# Patient Record
Sex: FEMALE | Race: WHITE | NOT HISPANIC OR LATINO | URBAN - METROPOLITAN AREA
[De-identification: names, ages, dates, MRNs, and addresses within clinical notes are randomized per-mention and may not be internally consistent; named-entity substitution may affect disease eponyms.]

---

## 2020-09-10 PROBLEM — Z00.00 ENCOUNTER FOR PREVENTIVE HEALTH EXAMINATION: Status: ACTIVE | Noted: 2020-09-10

## 2021-08-20 ENCOUNTER — EMERGENCY (EMERGENCY)
Facility: HOSPITAL | Age: 46
LOS: 1 days | Discharge: HOME | End: 2021-08-20
Attending: EMERGENCY MEDICINE | Admitting: EMERGENCY MEDICINE
Payer: COMMERCIAL

## 2021-08-20 VITALS
TEMPERATURE: 98 F | SYSTOLIC BLOOD PRESSURE: 116 MMHG | RESPIRATION RATE: 16 BRPM | HEART RATE: 64 BPM | OXYGEN SATURATION: 98 % | DIASTOLIC BLOOD PRESSURE: 74 MMHG

## 2021-08-20 VITALS
RESPIRATION RATE: 16 BRPM | WEIGHT: 175.05 LBS | DIASTOLIC BLOOD PRESSURE: 77 MMHG | OXYGEN SATURATION: 98 % | TEMPERATURE: 98 F | HEART RATE: 68 BPM | HEIGHT: 62 IN | SYSTOLIC BLOOD PRESSURE: 116 MMHG

## 2021-08-20 LAB — HCG UR QL: NEGATIVE — SIGNIFICANT CHANGE UP

## 2021-08-20 PROCEDURE — 70450 CT HEAD/BRAIN W/O DYE: CPT | Mod: 26,MA

## 2021-08-20 PROCEDURE — 99284 EMERGENCY DEPT VISIT MOD MDM: CPT

## 2021-08-20 PROCEDURE — 99284 EMERGENCY DEPT VISIT MOD MDM: CPT | Mod: 25

## 2021-08-20 PROCEDURE — 70450 CT HEAD/BRAIN W/O DYE: CPT

## 2021-08-20 PROCEDURE — 81025 URINE PREGNANCY TEST: CPT

## 2021-08-20 PROCEDURE — 96372 THER/PROPH/DIAG INJ SC/IM: CPT

## 2021-08-20 RX ORDER — DIPHENHYDRAMINE HCL 50 MG
1 CAPSULE ORAL
Qty: 15 | Refills: 0
Start: 2021-08-20

## 2021-08-20 RX ORDER — IBUPROFEN 200 MG
1 TABLET ORAL
Qty: 30 | Refills: 0
Start: 2021-08-20

## 2021-08-20 RX ORDER — KETOROLAC TROMETHAMINE 30 MG/ML
60 SYRINGE (ML) INJECTION ONCE
Refills: 0 | Status: DISCONTINUED | OUTPATIENT
Start: 2021-08-20 | End: 2021-08-20

## 2021-08-20 RX ADMIN — Medication 60 MILLIGRAM(S): at 09:05

## 2021-08-20 RX ADMIN — Medication 60 MILLIGRAM(S): at 08:45

## 2021-08-20 NOTE — ED PROVIDER NOTE - NSFOLLOWUPINSTRUCTIONS_ED_ALL_ED_FT
Migraine Headache    WHAT YOU NEED TO KNOW:    A migraine is a severe headache. The pain can be so severe that it interferes with your daily activities. A migraine can last a few hours up to several days. The exact cause of migraines is not known.    DISCHARGE INSTRUCTIONS:    Call your local emergency number (911 in the [place]) or have someone call if:   •You feel like you are going to faint, you become confused, or you have a seizure.          Return to the emergency department if:   •You have a headache that seems different or much worse than your usual migraine headache.      •You have a severe headache with a fever or a stiff neck.      •You have new problems with speech, vision, balance, or movement.      Call your doctor or neurologist if:   •Your migraines interfere with your daily activities.      •Your medicines or treatments stop working.      •You have questions or concerns about your condition or care.      Medicines: Some medicines may only be given while you are in the emergency department. You may also need medicines later to manage migraines or other health problems they can cause. Take medicine as soon as you feel a migraine begin, or as directed.  •Migraine medicines are used to help prevent or stop a migraine.      •NSAIDs help decrease swelling and pain or fever. This medicine is available with or without a doctor's order. NSAIDs can cause stomach bleeding or kidney problems in certain people. If you take blood thinner medicine, always ask your healthcare provider if NSAIDs are safe for you. Always read the medicine label and follow directions.      •Acetaminophen decreases pain and fever. It is available without a doctor's order. Ask how much to take and how often to take it. Follow directions. Read the labels of all other medicines you are using to see if they also contain acetaminophen, or ask your doctor or pharmacist. Acetaminophen can cause liver damage if not taken correctly. Do not use more than 4 grams (4,000 milligrams) total of acetaminophen in one day.       •Prescription pain medicine may be given. Ask your healthcare provider how to take this medicine safely. Some prescription pain medicines contain acetaminophen. Do not take other medicines that contain acetaminophen without talking to your healthcare provider. Too much acetaminophen may cause liver damage. Prescription pain medicine may cause constipation. Ask your healthcare provider how to prevent or treat constipation.       •Antinausea medicine may be given to calm your stomach and to help prevent vomiting. This medicine can also help relieve pain.      •Steroids may be given to prevent a migraine from coming back right away.      •Take your medicine as directed. Contact your healthcare provider if you think your medicine is not helping or if you have side effects. Tell him or her if you are allergic to any medicine. Keep a list of the medicines, vitamins, and herbs you take. Include the amounts, and when and why you take them. Bring the list or the pill bottles to follow-up visits. Carry your medicine list with you in case of an emergency.      Manage your symptoms:   •Rest in a dark, quiet room. This will help decrease your pain. Sleep may also help relieve the pain.      •Apply ice to decrease pain. Use an ice pack, or put crushed ice in a plastic bag. Cover the ice pack with a towel and place it on your head. Apply ice for 15 to 20 minutes every hour.      •Apply heat to decrease pain and muscle spasms. Use a small towel dampened with warm water or a heating pad, or sit in a warm bath. Apply heat on the area for 20 to 30 minutes every 2 hours. You may alternate heat and ice.      •Keep a migraine record. Write down when your migraines start and stop. Include your symptoms and what you were doing when a migraine began. Record what you ate or drank for 24 hours before the migraine started. Keep track of what you did to treat your migraine and if it worked. Bring the migraine record with you to visits with your healthcare provider.      Common triggers for a migraine include the following:   •Stress, eye strain, oversleeping, or not getting enough sleep      •Hormone changes in women from birth control pills, pregnancy, menopause, or during a monthly period      •Skipping meals, going too long without eating, or not drinking enough liquids      •Certain foods or drinks such as chocolate, hard cheese, alcohol, or drinks that contain caffeine      •Foods that contain gluten, nitrates, MSG, or artificial sweeteners      •Sunlight, bright or flashing lights, loud noises, smoke, or strong smells      •Heat, humidity, or changes in the weather      Prevent another migraine:   •Prevent a medicine overuse headache. Take pain medicines only as long as directed. A medicine may be limited to a certain amount each month. Your healthcare provider can help you create a plan so you get a safe amount each month.      •Do not smoke. Nicotine and other chemicals in cigarettes and cigars can trigger a migraine or make it worse. Ask your healthcare provider for information if you currently smoke and need help to quit. E-cigarettes or smokeless tobacco still contain nicotine. Talk to your healthcare provider before you use these products.      •Do not drink alcohol. Alcohol can trigger a migraine. It can also keep medicines used to treat your migraines from working.      •Be physically active. Physical activity, such as exercise, may help prevent migraines. Talk to your healthcare provider about the best activity plan for you. Try to get at least 30 minutes of physical activity on most days.   Family Walking for Exercise           •Manage stress. Stress may trigger a migraine. Learn new ways to relax, such as deep breathing.      •Create a sleep schedule. Go to bed and get up at the same times each day. Do not watch television before bed.      •Eat a variety of healthy foods. Include healthy foods such as fruit, vegetables, whole-grain breads, low-fat dairy products, beans, lean meat, and fish. Do not have food or drinks that trigger your migraines.  Healthy Foods           •Drink more liquids to prevent dehydration. Your healthcare provider can tell you how much liquid to drink each day and which liquids are best for you.      Follow up with your doctor or neurologist as directed: Bring your migraine record with you. Write down your questions so you remember to ask them during your visits. Migraine Headache    WHAT YOU NEED TO KNOW:    A migraine is a severe headache. The pain can be so severe that it interferes with your daily activities. A migraine can last a few hours up to several days. The exact cause of migraines is not known.    DISCHARGE INSTRUCTIONS:    Call your local emergency number (911 in the [place]) or have someone call if:   •You feel like you are going to faint, you become confused, or you have a seizure.          Return to the emergency department if:   •You have a headache that seems different or much worse than your usual migraine headache.      •You have a severe headache with a fever or a stiff neck.      •You have new problems with speech, vision, balance, or movement.      Call your doctor or neurologist if:   •Your migraines interfere with your daily activities.      •Your medicines or treatments stop working.      •You have questions or concerns about your condition or care.      Medicines: Some medicines may only be given while you are in the emergency department. You may also need medicines later to manage migraines or other health problems they can cause. Take medicine as soon as you feel a migraine begin, or as directed.  •Migraine medicines are used to help prevent or stop a migraine.      •NSAIDs help decrease swelling and pain or fever. This medicine is available with or without a doctor's order. NSAIDs can cause stomach bleeding or kidney problems in certain people. If you take blood thinner medicine, always ask your healthcare provider if NSAIDs are safe for you. Always read the medicine label and follow directions.      •Acetaminophen decreases pain and fever. It is available without a doctor's order. Ask how much to take and how often to take it. Follow directions. Read the labels of all other medicines you are using to see if they also contain acetaminophen, or ask your doctor or pharmacist. Acetaminophen can cause liver damage if not taken correctly. Do not use more than 4 grams (4,000 milligrams) total of acetaminophen in one day.       •Prescription pain medicine may be given. Ask your healthcare provider how to take this medicine safely. Some prescription pain medicines contain acetaminophen. Do not take other medicines that contain acetaminophen without talking to your healthcare provider. Too much acetaminophen may cause liver damage. Prescription pain medicine may cause constipation. Ask your healthcare provider how to prevent or treat constipation.       •Antinausea medicine may be given to calm your stomach and to help prevent vomiting. This medicine can also help relieve pain.      •Steroids may be given to prevent a migraine from coming back right away.      •Take your medicine as directed. Contact your healthcare provider if you think your medicine is not helping or if you have side effects. Tell him or her if you are allergic to any medicine. Keep a list of the medicines, vitamins, and herbs you take. Include the amounts, and when and why you take them. Bring the list or the pill bottles to follow-up visits. Carry your medicine list with you in case of an emergency.      Manage your symptoms:   •Rest in a dark, quiet room. This will help decrease your pain. Sleep may also help relieve the pain.      •Apply ice to decrease pain. Use an ice pack, or put crushed ice in a plastic bag. Cover the ice pack with a towel and place it on your head. Apply ice for 15 to 20 minutes every hour.      •Apply heat to decrease pain and muscle spasms. Use a small towel dampened with warm water or a heating pad, or sit in a warm bath. Apply heat on the area for 20 to 30 minutes every 2 hours. You may alternate heat and ice.      •Keep a migraine record. Write down when your migraines start and stop. Include your symptoms and what you were doing when a migraine began. Record what you ate or drank for 24 hours before the migraine started. Keep track of what you did to treat your migraine and if it worked. Bring the migraine record with you to visits with your healthcare provider.      Common triggers for a migraine include the following:   •Stress, eye strain, oversleeping, or not getting enough sleep      •Hormone changes in women from birth control pills, pregnancy, menopause, or during a monthly period      •Skipping meals, going too long without eating, or not drinking enough liquids      •Certain foods or drinks such as chocolate, hard cheese, alcohol, or drinks that contain caffeine      •Foods that contain gluten, nitrates, MSG, or artificial sweeteners      •Sunlight, bright or flashing lights, loud noises, smoke, or strong smells      •Heat, humidity, or changes in the weather      Prevent another migraine:   •Prevent a medicine overuse headache. Take pain medicines only as long as directed. A medicine may be limited to a certain amount each month. Your healthcare provider can help you create a plan so you get a safe amount each month.      •Do not smoke. Nicotine and other chemicals in cigarettes and cigars can trigger a migraine or make it worse. Ask your healthcare provider for information if you currently smoke and need help to quit. E-cigarettes or smokeless tobacco still contain nicotine. Talk to your healthcare provider before you use these products.      •Do not drink alcohol. Alcohol can trigger a migraine. It can also keep medicines used to treat your migraines from working.      •Be physically active. Physical activity, such as exercise, may help prevent migraines. Talk to your healthcare provider about the best activity plan for you. Try to get at least 30 minutes of physical activity on most days.   Family Walking for Exercise           •Manage stress. Stress may trigger a migraine. Learn new ways to relax, such as deep breathing.      •Create a sleep schedule. Go to bed and get up at the same times each day. Do not watch television before bed.      •Eat a variety of healthy foods. Include healthy foods such as fruit, vegetables, whole-grain breads, low-fat dairy products, beans, lean meat, and fish. Do not have food or drinks that trigger your migraines.  Healthy Foods           •Drink more liquids to prevent dehydration. Your healthcare provider can tell you how much liquid to drink each day and which liquids are best for you.      Follow up with your doctor or neurologist as directed: Bring your migraine record with you. Write down your questions so you remember to ask them during your visits.    Take Melatonin or Benadryl as discussed for sleep. Take Ibuprofen as directed for pain.

## 2021-08-20 NOTE — ED PROVIDER NOTE - CHPI ED SYMPTOMS NEG
stated
no blurred vision/no confusion/no fever/no loss of consciousness/no nausea/no numbness/no vomiting/no weakness/no change in level of consciousness

## 2021-08-20 NOTE — ED PROVIDER NOTE - EYES NEGATIVE STATEMENT, MLM
[facility]  Joint/Spine Preoperative Instructions    Surgery Date 8/3/20          Time of Arrival 0584  Contact # [phone] cell    1. On the day of your surgery, please report to the Surgical Services Registration Desk and sign in at your designated time. The Surgery Center is located to the right of the Emergency Room. 2. You must have someone with you to drive you home. You should not drive a car for 24 hours following surgery. Please make arrangements for a friend or family member to stay with you for the first 24 hours after your surgery. 3. No food after midnight 8/2/20*. Medications morning of surgery should be taken with a sip of water. Please follow pre-surgery drink instructions that were given at your Pre Admission Testing appointment. 4. We recommend you do not drink any alcoholic beverages for 24 hours before and after your surgery. 5. Contact your surgeons office for instructions on the following medications: non-steroidal anti-inflammatory drugs (i.e. Advil, Aleve), vitamins, and supplements. (Some surgeons will want you to stop these medications prior to surgery and others may allow you to take them)  **If you are currently taking Plavix, Coumadin, Aspirin and/or other blood-thinning agents, contact your surgeon for instructions. ** Your surgeon will partner with the physician prescribing these medications to determine if it is safe to stop or if you need to continue taking. Please do not stop taking these medications without instructions from your surgeon    6. Wear comfortable clothes. Wear glasses instead of contacts. Do not bring any money or jewelry. Please bring picture ID, insurance card, and any prearranged co-payment or hospital payment. Do not wear make-up, particularly mascara the morning of your surgery. Do not wear nail polish, particularly if you are having foot /hand surgery.   Wear your hair loose or down, no ponytails, buns, cassius pins or clips. All body piercings must be removed. Please shower with antibacterial soap for three consecutive days before and on the morning of surgery, but do not apply any lotions, powders or deodorants after the shower on the day of surgery. Please use a fresh towels after each shower. Please sleep in clean clothes and change bed linens the night before surgery. Please do not shave for 48 hours prior to surgery. Shaving of the face is acceptable. 7. You should understand that if you do not follow these instructions your surgery may be cancelled. If your physical condition changes (I.e. fever, cold or flu) please contact your surgeon as soon as possible. 8. It is important that you be on time. If a situation occurs where you may be late, please call [phone] (OR Holding Area). 9. If you have any questions and or problems, please call [phone] (Pre-admission Testing). 10. Your surgery time may be subject to change. You will receive a phone call the evening prior if your time changes. 11.  If having outpatient surgery, you must have someone to drive you here, stay with you during the duration of your stay, and to drive you home at time of discharge. 12. The following link is for the educational video for patients and/or families. [email or web address]-liang.org/. [email or web address]    Special Instructions:   COVID testing scheduled for 7/30/20 @ 1145.  (please see directions/location attached). Practice incentive spirometer per instructions and bring to hospital day of surgery. TAKE ALL MEDICATIONS THE DAY OF SURGERY EXCEPT: Lisinopril      I understand a pre-operative phone call will be made to verify my surgery time. In the event that I am not available, I give permission for a message to be left on my answering service and/or with another person?   yes         ___________________        __________ 7/21/20 @ [address] [place] [address]    (Signature of Patient)             (Witness)                (Date and Time) no discharge, no irritation, no pain, no redness, and no visual changes.

## 2021-08-20 NOTE — ED PROVIDER NOTE - PROGRESS NOTE DETAILS
CT neg. Feels much better after toradol. Plan - DC home with pain meds. FU Dr Escamilla as discussed.

## 2021-08-20 NOTE — ED PROVIDER NOTE - PROVIDER TOKENS
PROVIDER:[TOKEN:[[id number]],FOLLOWUP:[Urgent],ESTABLISHEDPATIENT:[T]],PROVIDER:[TOKEN:[[id number]],FOLLOWUP:[1-3 Days]]

## 2021-08-20 NOTE — ED ADULT NURSE NOTE - OBJECTIVE STATEMENT
Amb to ED. Pt c/o headache since 3am with slight nausea. Denies vomiting or visual disturbances. O/E color good. Skin warm 7 dry to touch. Lungs clear.

## 2021-08-20 NOTE — ED PROVIDER NOTE - PATIENT PORTAL LINK FT
You can access the FollowMyHealth Patient Portal offered by [facility] by registering at the following website: [email or web address]. By joining [employer]’s FollowMyHealth portal, you will also be able to view your health information using other applications (apps) compatible with our system.

## 2021-08-20 NOTE — ED PROVIDER NOTE - OBJECTIVE STATEMENT
44 yo F with hx of PCOS, co dizziness and headache with insomnia since 2 days ago. Took Ibuprofen 2 days ago with some improvement.  States she is under a lot of stress due to divorce she just filed for. Denies fever, visual disturbance, NV, CP or other symptom. Denies thunderclap.  PCP Andi

## 2021-08-20 NOTE — ED PROVIDER NOTE - CARE PROVIDER_API CALL
MAHESH ALMONTE  [facility]  [address] [place] [address]  Phone: [phone]  Fax: [phone]  Established Patient  Follow Up Time: Urgent    Beronica Thorpe  NEUROLOGY  [address] [place] [address]  Phone: [phone]  Fax: [phone]  Follow Up Time: 1-3 Days

## 2021-08-20 NOTE — ED ADULT NURSE NOTE - NSIMPLEMENTINTERV_GEN_ALL_ED
Implemented All Universal Safety Interventions:  [place] to call system. Call bell, personal items and telephone within reach. Instruct patient to call for assistance. Room bathroom lighting operational. Non-slip footwear when patient is off stretcher. Physically safe environment: no spills, clutter or unnecessary equipment. Stretcher in lowest position, wheels locked, appropriate side rails in place. Bilobed Transposition Flap Text: The defect edges were debeveled with a #15 scalpel blade.  Given the location of the defect and the proximity to free margins a bilobed transposition flap was deemed most appropriate.  Using a sterile surgical marker, an appropriate bilobe flap drawn around the defect.    The area thus outlined was incised deep to adipose tissue with a #15 scalpel blade.  The skin margins were undermined to an appropriate distance in all directions utilizing iris scissors.

## 2021-08-20 NOTE — ED PROVIDER NOTE - CLINICAL SUMMARY MEDICAL DECISION MAKING FREE TEXT BOX
Headache dizziness and anxiety for 2 days. normal. Likely stress related migraine. Plan - CT brain, Toradol. Neuro follow up.

## 2021-12-18 ENCOUNTER — TRANSCRIPTION ENCOUNTER (OUTPATIENT)
Age: 46
End: 2021-12-18

## 2022-02-02 ENCOUNTER — EMERGENCY (EMERGENCY)
Facility: HOSPITAL | Age: 47
LOS: 1 days | Discharge: HOME | End: 2022-02-02
Attending: EMERGENCY MEDICINE | Admitting: EMERGENCY MEDICINE
Payer: MEDICAID

## 2022-02-02 VITALS
HEART RATE: 91 BPM | DIASTOLIC BLOOD PRESSURE: 86 MMHG | SYSTOLIC BLOOD PRESSURE: 132 MMHG | OXYGEN SATURATION: 99 % | TEMPERATURE: 98 F | RESPIRATION RATE: 18 BRPM | HEIGHT: 62 IN | WEIGHT: 164.91 LBS

## 2022-02-02 PROCEDURE — 99283 EMERGENCY DEPT VISIT LOW MDM: CPT

## 2022-02-02 PROCEDURE — 99285 EMERGENCY DEPT VISIT HI MDM: CPT

## 2022-02-02 NOTE — ED ADULT NURSE REASSESSMENT NOTE - NS ED NURSE REASSESS COMMENT FT1
After speaking with patient Dr. Rojas feels psych eval and constant observation are not indicated. Patient may be discharged and does not need workup. Belongings returned, constant observation discontinued.

## 2022-02-02 NOTE — ED ADULT NURSE NOTE - HPI (INCLUDE ILLNESS QUALITY, SEVERITY, DURATION, TIMING, CONTEXT, MODIFYING FACTORS, ASSOCIATED SIGNS AND SYMPTOMS)
EMS was called to the house where she is living to check on her and be sure she is safe and not threatened. Patient expressed that she wished this would all end but denies meaning she wanted to hurt herself. Patient is in the middle of a divorce and also is having a workup for an abnormal mammo. EMS was called to the house where she is living to check on her and be sure she is safe and not threatened. Patient expressed that she wished this would all end but denies meaning she wanted to hurt herself. Patient is in the middle of a divorce and also is having a workup for an abnormal mammo. patient is currently lving with her in-laws and has 2 of her 4 children living there with her as well and states it is stressful because of the divorce but she doesn't feel in danger and is not being treated poorly there.

## 2022-02-02 NOTE — ED ADULT NURSE NOTE - OBJECTIVE STATEMENT
Brought in by mobile crisis for psych eval. Family member called for the patient to have a safety check because he was concerned about her treatment where she is living. Patient is in the process of a divorce and is living with her son's family. Patient also has had a questionable mammo recently and is scheduled for  diagnostic sono and mammo tomorrow which is causing concern. 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 Of note the patient had covid 12/2021. Brought in by mobile crisis for psych eval. Patient's good friend who is in [place] spoke with her today and was concerned and called for the patient to have a safety check because he was concerned about her treatment where she is living. Patient is in the process of a divorce and is living with her in-laws with 2 of her 4 children. Patient also has had a questionable mammo recently and is scheduled for  diagnostic sono and mammo tomorrow which is causing concern. 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 Of note the patient had covid 12/2021.

## 2022-02-02 NOTE — ED PROVIDER NOTE - PATIENT PORTAL LINK FT
You can access the FollowMyHealth Patient Portal offered by [facility] by registering at the following website: [email or web address]. By joining [employer]’s FollowMyHealth portal, you will also be able to view your health information using other applications (apps) compatible with our system.

## 2022-02-02 NOTE — ED PROVIDER NOTE - NSFOLLOWUPINSTRUCTIONS_ED_ALL_ED_FT
Stress, Adult      Stress is a normal reaction to life events. Stress is what you feel when life demands more than you are used to, or more than you think you can handle. Some stress can be useful, such as studying for a test or meeting a deadline at work. Stress that occurs too often or for too long can cause problems. It can affect your emotional health and interfere with relationships and normal daily activities. Too much stress can weaken your body's defense system (immune system) and increase your risk for physical illness. If you already have a medical problem, stress can make it worse.      What are the causes?    All sorts of life events can cause stress. An event that causes stress for one person may not be stressful for another person. Major life events, whether positive or negative, commonly cause stress. Examples include:  •Losing a job or starting a new job.      •Losing a loved one.      •Moving to a new town or home.      •Getting  or .      •Having a baby.      •Getting injured or sick.      Less obvious life events can also cause stress, especially if they occur day after day or in combination with each other. Examples include:  •Working long hours.      •Driving in traffic.      •Caring for children.      •Being in debt.      •Being in a difficult relationship.        What are the signs or symptoms?    Stress can cause emotional symptoms, including:  •Anxiety. This is feeling worried, afraid, on edge, overwhelmed, or out of control.      •Anger, including irritation or impatience.      •Depression. This is feeling sad, down, helpless, or guilty.      •Trouble focusing, remembering, or making decisions.      Stress can cause physical symptoms, including:  •Aches and pains. These may affect your head, neck, back, stomach, or other areas of your body.      •Tight muscles or a clenched jaw.      •Low energy.      •Trouble sleeping.      Stress can cause unhealthy behaviors, including:  •Eating to feel better (overeating) or skipping meals.      •Working too much or putting off tasks.      •Smoking, drinking alcohol, or using drugs to feel better.        How is this diagnosed?    Stress is diagnosed through an assessment by your health care provider. He or she may diagnose this condition based on:  •Your symptoms and any stressful life events.      •Your medical history.      •Tests to rule out other causes of your symptoms.      Depending on your condition, your health care provider may refer you to a specialist for further evaluation.      How is this treated?     Stress management techniques are the recommended treatment for stress. Medicine is not typically recommended for the treatment of stress.    Techniques to reduce your reaction to stressful life events include:  •Stress identification. Monitor yourself for symptoms of stress and identify what causes stress for you. These skills may help you to avoid or prepare for stressful events.      •Time management. Set your priorities, keep a calendar of events, and learn to say no. Taking these actions can help you avoid making too many commitments.      Techniques for coping with stress include:  •Rethinking the problem. Try to think realistically about stressful events rather than ignoring them or overreacting. Try to find the positives in a stressful situation rather than focusing on the negatives.      •Exercise. Physical exercise can release both physical and emotional tension. The key is to find a form of exercise that you enjoy and do it regularly.    •Relaxation techniques. These relax the body and mind. The key is to find one or more that you enjoy and use the techniques regularly. Examples include:  •Meditation, deep breathing, or progressive relaxation techniques.      •Yoga or yissel chi.      •Biofeedback, mindfulness techniques, or journaling.      •Listening to music, being out in nature, or participating in other hobbies.        •Practicing a healthy lifestyle. Eat a balanced diet, drink plenty of water, limit or avoid caffeine, and get plenty of sleep.      •Having a strong support network. Spend time with family, friends, or other people you enjoy being around. Express your feelings and talk things over with someone you trust.      Counseling or talk therapy with a mental health professional may be helpful if you are having trouble managing stress on your own.      Follow these instructions at home:      Lifestyle      •Avoid drugs.      • Do not use any products that contain nicotine or tobacco, such as cigarettes, e-cigarettes, and chewing tobacco. If you need help quitting, ask your health care provider.      •Limit alcohol intake to no more than 1 drink a day for nonpregnant women and 2 drinks a day for men. One drink equals 12 oz of beer, 5 oz of wine, or 1½ oz of hard liquor      • Do not use alcohol or drugs to relax.      •Eat a balanced diet that includes fresh fruits and vegetables, whole grains, lean meats, fish, eggs, and beans, and low-fat dairy. Avoid processed foods and foods high in added fat, sugar, and salt.      •Exercise at least 30 minutes on 5 or more days each week.      •Get 7–8 hours of sleep each night.        General instructions      •Practice stress management techniques as discussed with your health care provider.      •Drink enough fluid to keep your urine clear or pale yellow.      •Take over-the-counter and prescription medicines only as told by your health care provider.      •Keep all follow-up visits as told by your health care provider. This is important.        Contact a health care provider if:    •Your symptoms get worse.      •You have new symptoms.      •You feel overwhelmed by your problems and can no longer manage them on your own.        Get help right away if:    •You have thoughts of hurting yourself or others.      If you ever feel like you may hurt yourself or others, or have thoughts about taking your own life, get help right away. You can go to your nearest emergency department or call:   • Your local emergency services (911 in the [place]       • A suicide crisis helpline, such as the National Suicide Prevention Lifeline at 1-615.415.5228. This is open 24 hours a day.         Summary    •Stress is a normal reaction to life events. It can cause problems if it happens too often or for too long.      •Practicing stress management techniques is the best way to treat stress.      •Counseling or talk therapy with a mental health professional may be helpful if you are having trouble managing stress on your own.      This information is not intended to replace advice given to you by your health care provider. Make sure you discuss any questions you have with your health care provider.

## 2022-02-02 NOTE — ED PROVIDER NOTE - MDM ORDERS SUBMITTED SELECTION
[FreeTextEntry1] : 57 year old female presents for evaluation of pre-diabetes and hypothyroidism. She reports that about 5 years ago she was diagnosed with pre-diabetes and was started on Metformin. She experienced severe GI upset and it was stopped. She has been controlling it with diet and exercise. Recently she did lose 20+ lbs and her A1C remained the same. She was sent by her PCP for evaluation of why her A1C did not go down more. \par Of note, she was found to have thyroid cancer in 12/2010 and underwent a total thyroidectomy and partial parathyroidectomy in 2/2011. Final pathology was benign. She did not receive any further treatment. Presently she is on Levothyroxine 112 mcg daily.\par She was recently diagnosed with autonomic nervous system dysfunction. She reports that she had COVID 3/2020 and had long haul symptoms, which seemed to have worsened since her COVID vaccination 1/2021. She is currently being evaluated by Neurology. She has a difficult time sitting up and can only be supine for 1 hour before she begins to experience hypotension and dizziness.  Not Applicable

## 2022-02-02 NOTE — ED PROVIDER NOTE - OBJECTIVE STATEMENT
Patient brought in by EMS after police were called to the home by a friend of the patient who lives in [place]. Patient is going through a divorce and presently lives with her 's family. Was very upset today, and was talking to her friend on the phone. Denies saying anything to indicate thoughts of self harm, and patient states she is not suicidal at all. She is stressed over her situation but is employed, has 4 children and states she has much to live for and has never thought of harming herself    Called Сергей ([phone]) who states he called the police as he wanted them to check on her, uncertain if any household memebers were harming her. He confirms that she never indicated that she had thoughts of self harm

## 2022-02-03 PROBLEM — E28.2 POLYCYSTIC OVARIAN SYNDROME: Chronic | Status: ACTIVE | Noted: 2021-08-20

## 2022-02-18 ENCOUNTER — TRANSCRIPTION ENCOUNTER (OUTPATIENT)
Age: 47
End: 2022-02-18
